# Patient Record
Sex: MALE | Race: WHITE | NOT HISPANIC OR LATINO | URBAN - NONMETROPOLITAN AREA
[De-identification: names, ages, dates, MRNs, and addresses within clinical notes are randomized per-mention and may not be internally consistent; named-entity substitution may affect disease eponyms.]

---

## 2018-12-28 ENCOUNTER — APPOINTMENT (RX ONLY)
Dept: URBAN - NONMETROPOLITAN AREA CLINIC 26 | Facility: CLINIC | Age: 58
Setting detail: DERMATOLOGY
End: 2018-12-28

## 2018-12-28 DIAGNOSIS — L30.9 DERMATITIS, UNSPECIFIED: ICD-10-CM

## 2018-12-28 PROCEDURE — 99202 OFFICE O/P NEW SF 15 MIN: CPT

## 2018-12-28 PROCEDURE — ? COUNSELING

## 2018-12-28 PROCEDURE — ? ADDITIONAL NOTES

## 2018-12-28 NOTE — HPI: RASH
How Severe Is Your Rash?: moderate
Is This A New Presentation, Or A Follow-Up?: Rash
Additional History: Rash is not present

## 2019-01-15 ENCOUNTER — APPOINTMENT (RX ONLY)
Dept: URBAN - NONMETROPOLITAN AREA CLINIC 26 | Facility: CLINIC | Age: 59
Setting detail: DERMATOLOGY
End: 2019-01-15

## 2019-01-15 DIAGNOSIS — L50.8 OTHER URTICARIA: ICD-10-CM

## 2019-01-15 DIAGNOSIS — L50.1 IDIOPATHIC URTICARIA: ICD-10-CM

## 2019-01-15 PROBLEM — I63.50 CEREBRAL INFARCTION DUE TO UNSPECIFIED OCCLUSION OR STENOSIS OF UNSPECIFIED CEREBRAL ARTERY: Status: ACTIVE | Noted: 2019-01-15

## 2019-01-15 PROBLEM — L30.9 DERMATITIS, UNSPECIFIED: Status: ACTIVE | Noted: 2019-01-15

## 2019-01-15 PROCEDURE — 99212 OFFICE O/P EST SF 10 MIN: CPT | Mod: 25

## 2019-01-15 PROCEDURE — 11104 PUNCH BX SKIN SINGLE LESION: CPT

## 2019-01-15 PROCEDURE — ? COUNSELING

## 2019-01-15 PROCEDURE — ? ADDITIONAL NOTES

## 2019-01-15 PROCEDURE — ? BIOPSY BY PUNCH METHOD

## 2019-01-15 ASSESSMENT — LOCATION ZONE DERM: LOCATION ZONE: LEG

## 2019-01-15 ASSESSMENT — LOCATION DETAILED DESCRIPTION DERM: LOCATION DETAILED: LEFT PROXIMAL CALF

## 2019-01-15 ASSESSMENT — LOCATION SIMPLE DESCRIPTION DERM: LOCATION SIMPLE: LEFT CALF

## 2019-01-15 NOTE — PROCEDURE: BIOPSY BY PUNCH METHOD
Detail Level: Detailed
Punch Size In Mm: 4
Anesthesia Volume In Cc: 1
Anesthesia Type: 1% lidocaine without epinephrine
X Depth Of Punch In Cm (Optional): 0
Billing Type: Third-Party Bill
Was A Bandage Applied: Yes
Consent: Written consent was obtained and risks were reviewed including but not limited to scarring, infection, bleeding, scabbing, incomplete removal, nerve damage and allergy to anesthesia.
Notification Instructions: Patient will be notified of biopsy results. However, patient instructed to call the office if not contacted within 2 weeks.
Render Post-Care Instructions In Note?: no
Epidermal Sutures: 4-0 Prolene
Biopsy Type: H and E
Home Suture Removal Text: Patient was provided a home suture removal kit and will remove their sutures at home.  If they have any questions or difficulties they will call the office.
Suture Removal: 7 days
Wound Care: Petrolatum
Hemostasis: None
Dressing: bandage
Post-Care Instructions: I reviewed with the patient in detail post-care instructions. Patient is to keep the biopsy site dry overnight, and then apply bacitracin twice daily until healed. Patient may apply hydrogen peroxide soaks to remove any crusting.

## 2019-01-15 NOTE — PROCEDURE: ADDITIONAL NOTES
Additional Notes: GIVEN APPEARANCE ON LOWER LEGS, NEED TO R/U VASCULITIS. COULD BE URTICARIAL VASCULITIS. ORDERED URINALYSIS FOR NOW SINCE ACTIVE. PT DOES NOT HAVE INSURANCE SO WILL HOLD MORE EXTENSIVE VASCULITIS WORKUP UNTIL BIOPSY RETURNS. WILL LIKELY NEED [facility] LAB WORKUP. ASKED HER TO PLEASE WORK ON GETTING INSURANCE IN INTERIM AS SHE MAY BE CANDIDATE FOR MEDICAID TO HELP OFFSET COST
Detail Level: Simple
Additional Notes: MANY OF THE LESIONS ON HER TRUNK AND UPPER EXTREMITIES ARE CONSISTENT WITH URTICARIA. HOWEVER, HER LEGS ARE DIFFERENT AND MUCH MORE VIOLACEOUS. SHE HAS BEEN DOING ALLEGRA ONE PILL IN AM AND ONE IN PM. ASKED HER TO INCREASE TO 2 PILLS IN AM AND 2 PILLS IN PM. CAN CHANGE TO ZYRTEC IF RELIEF NOT OBTAINED WITH ALLEGRA. ALSO CAN ADD IN RANITIDINE BID.